# Patient Record
Sex: FEMALE
[De-identification: names, ages, dates, MRNs, and addresses within clinical notes are randomized per-mention and may not be internally consistent; named-entity substitution may affect disease eponyms.]

---

## 2019-07-23 ENCOUNTER — RX ONLY (RX ONLY)
Age: 64
End: 2019-07-23

## 2019-07-23 ENCOUNTER — OFFICE (OUTPATIENT)
Dept: URBAN - METROPOLITAN AREA CLINIC 114 | Facility: CLINIC | Age: 64
Setting detail: OPHTHALMOLOGY
End: 2019-07-23
Payer: COMMERCIAL

## 2019-07-23 DIAGNOSIS — H35.033: ICD-10-CM

## 2019-07-23 DIAGNOSIS — H04.123: ICD-10-CM

## 2019-07-23 DIAGNOSIS — H11.153: ICD-10-CM

## 2019-07-23 DIAGNOSIS — E11.3293: ICD-10-CM

## 2019-07-23 DIAGNOSIS — H43.813: ICD-10-CM

## 2019-07-23 PROBLEM — H10.45 ALLERGIC CONJUNCTIVITIS ; BOTH EYES: Status: RESOLVED | Noted: 2019-05-28 | Resolved: 2019-07-23

## 2019-07-23 PROCEDURE — 92012 INTRM OPH EXAM EST PATIENT: CPT | Performed by: OPHTHALMOLOGY

## 2019-07-23 ASSESSMENT — REFRACTION_AUTOREFRACTION
OD_AXIS: 090
OS_SPHERE: -0.25
OD_SPHERE: -0.50
OS_AXIS: 098
OD_CYLINDER: -1.50
OS_CYLINDER: -1.25

## 2019-07-23 ASSESSMENT — REFRACTION_MANIFEST
OS_VA3: 20/
OS_VA1: 20/
OD_VA2: 20/
OU_VA: 20/
OS_VA3: 20/
OD_VA1: 20/
OD_VA2: 20/
OS_VA2: 20/
OD_VA3: 20/
OD_VA1: 20/
OU_VA: 20/
OS_VA2: 20/
OD_VA3: 20/
OS_VA1: 20/

## 2019-07-23 ASSESSMENT — REFRACTION_CURRENTRX
OS_OVR_VA: 20/
OD_OVR_VA: 20/
OD_OVR_VA: 20/
OS_OVR_VA: 20/
OS_OVR_VA: 20/
OD_OVR_VA: 20/

## 2019-07-23 ASSESSMENT — VISUAL ACUITY
OD_BCVA: 20/20
OS_BCVA: 20/20

## 2019-07-23 ASSESSMENT — KERATOMETRY
OS_AXISANGLE_DEGREES: 137
OD_K1POWER_DIOPTERS: 40.00
OD_K2POWER_DIOPTERS: 40.00
OS_K1POWER_DIOPTERS: 40.00
OS_K2POWER_DIOPTERS: 40.25
OD_AXISANGLE_DEGREES: 090

## 2019-07-23 ASSESSMENT — CONFRONTATIONAL VISUAL FIELD TEST (CVF)
OD_FINDINGS: FULL
OS_FINDINGS: FULL

## 2019-07-23 ASSESSMENT — AXIALLENGTH_DERIVED
OS_AL: 25.286
OD_AL: 25.5085

## 2019-07-23 ASSESSMENT — SPHEQUIV_DERIVED
OD_SPHEQUIV: -1.25
OS_SPHEQUIV: -0.875

## 2019-07-23 ASSESSMENT — DRY EYES - PHYSICIAN NOTES
OD_GENERALCOMMENTS: CORNEAL STAINING
OS_GENERALCOMMENTS: CORNEAL STAINING

## 2020-02-05 ENCOUNTER — OFFICE (OUTPATIENT)
Dept: URBAN - METROPOLITAN AREA CLINIC 94 | Facility: CLINIC | Age: 65
Setting detail: OPHTHALMOLOGY
End: 2020-02-05
Payer: COMMERCIAL

## 2020-02-05 DIAGNOSIS — H43.813: ICD-10-CM

## 2020-02-05 DIAGNOSIS — H35.033: ICD-10-CM

## 2020-02-05 DIAGNOSIS — H11.153: ICD-10-CM

## 2020-02-05 DIAGNOSIS — E11.3293: ICD-10-CM

## 2020-02-05 DIAGNOSIS — H04.123: ICD-10-CM

## 2020-02-05 PROCEDURE — 92014 COMPRE OPH EXAM EST PT 1/>: CPT | Performed by: OPHTHALMOLOGY

## 2020-02-05 PROCEDURE — 92250 FUNDUS PHOTOGRAPHY W/I&R: CPT | Performed by: OPHTHALMOLOGY

## 2020-02-05 ASSESSMENT — REFRACTION_AUTOREFRACTION
OD_AXIS: 092
OD_CYLINDER: -1.50
OS_AXIS: 094
OS_CYLINDER: -1.75
OS_SPHERE: PLANO
OD_SPHERE: PLANO

## 2020-02-05 ASSESSMENT — REFRACTION_MANIFEST
OD_VA2: 20/
OS_VA2: 20/
OS_VA3: 20/
OD_VA1: 20/
OD_VA3: 20/
OU_VA: 20/
OS_VA1: 20/

## 2020-02-05 ASSESSMENT — CONFRONTATIONAL VISUAL FIELD TEST (CVF)
OS_FINDINGS: FULL
OD_FINDINGS: FULL

## 2020-02-05 ASSESSMENT — KERATOMETRY
OS_K2POWER_DIOPTERS: 40.00
OD_K1POWER_DIOPTERS: 39.50
OD_K2POWER_DIOPTERS: 40.00
OS_K1POWER_DIOPTERS: 39.75
OS_AXISANGLE_DEGREES: 142
OD_AXISANGLE_DEGREES: 180

## 2020-02-05 ASSESSMENT — DRY EYES - PHYSICIAN NOTES
OS_GENERALCOMMENTS: CORNEAL STAINING
OD_GENERALCOMMENTS: CORNEAL STAINING

## 2020-02-05 ASSESSMENT — VISUAL ACUITY
OD_BCVA: 20/25+
OS_BCVA: 20/25

## 2020-03-11 ENCOUNTER — RX ONLY (RX ONLY)
Age: 65
End: 2020-03-11

## 2020-03-11 ENCOUNTER — OFFICE (OUTPATIENT)
Dept: URBAN - METROPOLITAN AREA CLINIC 94 | Facility: CLINIC | Age: 65
Setting detail: OPHTHALMOLOGY
End: 2020-03-11
Payer: COMMERCIAL

## 2020-03-11 ENCOUNTER — ASC (OUTPATIENT)
Dept: URBAN - METROPOLITAN AREA SURGERY 8 | Facility: SURGERY | Age: 65
Setting detail: OPHTHALMOLOGY
End: 2020-03-11
Payer: COMMERCIAL

## 2020-03-11 DIAGNOSIS — H35.033: ICD-10-CM

## 2020-03-11 DIAGNOSIS — E11.3311: ICD-10-CM

## 2020-03-11 DIAGNOSIS — Z79.84: ICD-10-CM

## 2020-03-11 DIAGNOSIS — H25.13: ICD-10-CM

## 2020-03-11 DIAGNOSIS — H40.013: ICD-10-CM

## 2020-03-11 DIAGNOSIS — H11.153: ICD-10-CM

## 2020-03-11 DIAGNOSIS — H04.123: ICD-10-CM

## 2020-03-11 DIAGNOSIS — H43.813: ICD-10-CM

## 2020-03-11 PROCEDURE — 67210 TREATMENT OF RETINAL LESION: CPT | Performed by: OPHTHALMOLOGY

## 2020-03-11 PROCEDURE — 92012 INTRM OPH EXAM EST PATIENT: CPT | Performed by: OPHTHALMOLOGY

## 2020-03-11 ASSESSMENT — VISUAL ACUITY
OS_BCVA: 20/20
OD_BCVA: 20/20-1

## 2020-03-11 ASSESSMENT — KERATOMETRY
OS_K1POWER_DIOPTERS: 40.25
OD_AXISANGLE_DEGREES: 165
OD_K2POWER_DIOPTERS: 40.00
OS_AXISANGLE_DEGREES: 090
OS_K2POWER_DIOPTERS: 40.25
OD_K1POWER_DIOPTERS: 39.75

## 2020-03-11 ASSESSMENT — REFRACTION_AUTOREFRACTION
OS_CYLINDER: -1.50
OS_SPHERE: -0.25
OD_SPHERE: 0.00
OD_AXIS: 093
OS_AXIS: 094
OD_CYLINDER: -1.50

## 2020-03-11 ASSESSMENT — SPHEQUIV_DERIVED
OS_SPHEQUIV: -1
OD_SPHEQUIV: -0.75

## 2020-03-11 ASSESSMENT — DRY EYES - PHYSICIAN NOTES
OD_GENERALCOMMENTS: CORNEAL STAINING
OS_GENERALCOMMENTS: CORNEAL STAINING

## 2020-03-11 ASSESSMENT — CONFRONTATIONAL VISUAL FIELD TEST (CVF)
OD_FINDINGS: FULL
OS_FINDINGS: FULL

## 2020-03-11 ASSESSMENT — AXIALLENGTH_DERIVED
OS_AL: 25.2892
OD_AL: 25.3356

## 2020-03-18 ENCOUNTER — ASC (OUTPATIENT)
Dept: URBAN - METROPOLITAN AREA SURGERY 8 | Facility: SURGERY | Age: 65
Setting detail: OPHTHALMOLOGY
End: 2020-03-18
Payer: COMMERCIAL

## 2020-03-18 DIAGNOSIS — E11.3312: ICD-10-CM

## 2020-03-18 PROCEDURE — 67210 TREATMENT OF RETINAL LESION: CPT | Performed by: OPHTHALMOLOGY

## 2020-03-18 ASSESSMENT — SPHEQUIV_DERIVED
OD_SPHEQUIV: -0.875
OS_SPHEQUIV: 0

## 2020-03-18 ASSESSMENT — KERATOMETRY
OS_K2POWER_DIOPTERS: 40.25
OS_AXISANGLE_DEGREES: 098
OD_K1POWER_DIOPTERS: 40.00
OD_K2POWER_DIOPTERS: 40.00
OD_AXISANGLE_DEGREES: 090
OS_K1POWER_DIOPTERS: 40.00

## 2020-03-18 ASSESSMENT — REFRACTION_AUTOREFRACTION
OD_CYLINDER: -0.75
OS_SPHERE: +0.50
OD_AXIS: 103
OS_CYLINDER: -1.00
OD_SPHERE: -0.50
OS_AXIS: 093

## 2020-03-18 ASSESSMENT — VISUAL ACUITY
OS_BCVA: 20/20
OD_BCVA: 20/20-1

## 2020-03-18 ASSESSMENT — CONFRONTATIONAL VISUAL FIELD TEST (CVF)
OS_FINDINGS: FULL
OD_FINDINGS: FULL

## 2020-03-18 ASSESSMENT — AXIALLENGTH_DERIVED
OS_AL: 24.9003
OD_AL: 25.3388

## 2020-03-18 ASSESSMENT — DRY EYES - PHYSICIAN NOTES
OD_GENERALCOMMENTS: CORNEAL STAINING
OS_GENERALCOMMENTS: CORNEAL STAINING

## 2020-08-11 ENCOUNTER — OFFICE (OUTPATIENT)
Dept: URBAN - METROPOLITAN AREA CLINIC 114 | Facility: CLINIC | Age: 65
Setting detail: OPHTHALMOLOGY
End: 2020-08-11
Payer: MEDICARE

## 2020-08-11 DIAGNOSIS — H43.813: ICD-10-CM

## 2020-08-11 DIAGNOSIS — H04.123: ICD-10-CM

## 2020-08-11 DIAGNOSIS — E11.3311: ICD-10-CM

## 2020-08-11 DIAGNOSIS — H25.13: ICD-10-CM

## 2020-08-11 DIAGNOSIS — H35.033: ICD-10-CM

## 2020-08-11 DIAGNOSIS — E11.3312: ICD-10-CM

## 2020-08-11 DIAGNOSIS — H40.013: ICD-10-CM

## 2020-08-11 DIAGNOSIS — H11.153: ICD-10-CM

## 2020-08-11 PROCEDURE — 92133 CPTRZD OPH DX IMG PST SGM ON: CPT | Performed by: OPHTHALMOLOGY

## 2020-08-11 PROCEDURE — 92014 COMPRE OPH EXAM EST PT 1/>: CPT | Performed by: OPHTHALMOLOGY

## 2020-08-11 ASSESSMENT — DRY EYES - PHYSICIAN NOTES
OS_GENERALCOMMENTS: CORNEAL STAINING
OD_GENERALCOMMENTS: CORNEAL STAINING

## 2020-08-11 ASSESSMENT — CONFRONTATIONAL VISUAL FIELD TEST (CVF)
OD_FINDINGS: FULL
OS_FINDINGS: FULL

## 2020-08-13 ASSESSMENT — SPHEQUIV_DERIVED
OS_SPHEQUIV: -1.125
OD_SPHEQUIV: -1.125

## 2020-08-13 ASSESSMENT — REFRACTION_CURRENTRX
OD_SPHERE: +0.25
OD_OVR_VA: 20/
OD_VPRISM_DIRECTION: SV
OS_CYLINDER: -1.75
OD_CYLINDER: -1.50
OS_OVR_VA: 20/
OS_VPRISM_DIRECTION: SV
OS_AXIS: 092
OS_SPHERE: +0.25
OD_AXIS: 073

## 2020-08-13 ASSESSMENT — KERATOMETRY
OS_AXISANGLE_DEGREES: 106
OS_K2POWER_DIOPTERS: 40.50
OS_K1POWER_DIOPTERS: 40.00
OD_AXISANGLE_DEGREES: 095
OD_K2POWER_DIOPTERS: 40.25
OD_K1POWER_DIOPTERS: 40.00

## 2020-08-13 ASSESSMENT — AXIALLENGTH_DERIVED
OS_AL: 25.3453
OD_AL: 25.3984

## 2020-08-13 ASSESSMENT — VISUAL ACUITY
OS_BCVA: 20/30-2
OD_BCVA: 20/40

## 2020-08-13 ASSESSMENT — REFRACTION_AUTOREFRACTION
OS_SPHERE: -0.50
OD_AXIS: 088
OD_CYLINDER: -0.75
OD_SPHERE: -0.75
OS_AXIS: 093
OS_CYLINDER: -1.25

## 2020-08-19 ENCOUNTER — OFFICE (OUTPATIENT)
Dept: URBAN - METROPOLITAN AREA CLINIC 94 | Facility: CLINIC | Age: 65
Setting detail: OPHTHALMOLOGY
End: 2020-08-19
Payer: MEDICARE

## 2020-08-19 DIAGNOSIS — H11.153: ICD-10-CM

## 2020-08-19 DIAGNOSIS — H40.013: ICD-10-CM

## 2020-08-19 DIAGNOSIS — E11.3311: ICD-10-CM

## 2020-08-19 DIAGNOSIS — H25.11: ICD-10-CM

## 2020-08-19 DIAGNOSIS — H04.123: ICD-10-CM

## 2020-08-19 DIAGNOSIS — H35.033: ICD-10-CM

## 2020-08-19 DIAGNOSIS — H43.813: ICD-10-CM

## 2020-08-19 DIAGNOSIS — H25.13: ICD-10-CM

## 2020-08-19 DIAGNOSIS — E11.3312: ICD-10-CM

## 2020-08-19 PROCEDURE — 92012 INTRM OPH EXAM EST PATIENT: CPT | Performed by: OPHTHALMOLOGY

## 2020-08-19 PROCEDURE — 92136 OPHTHALMIC BIOMETRY: CPT | Performed by: OPHTHALMOLOGY

## 2020-08-19 PROCEDURE — 92083 EXTENDED VISUAL FIELD XM: CPT | Performed by: OPHTHALMOLOGY

## 2020-08-19 ASSESSMENT — KERATOMETRY
OD_CYLAXISANGLE_DEGREES: 180
OS_K1POWER_DIOPTERS: 40.00
OD_K1K2_AVERAGE: 40
OD_AXISANGLE_DEGREES: 090
OS_K2POWER_DIOPTERS: 40.00
OD_K2POWER_DIOPTERS: 40.00
OS_AXISANGLE2_DEGREES: 090
OS_K2POWER_DIOPTERS: 40.00
OD_K1POWER_DIOPTERS: 40.00
OS_K1POWER_DIOPTERS: 40.00
OS_CYLAXISANGLE_DEGREES: 180
OD_K1POWER_DIOPTERS: 40.00
OS_AXISANGLE_DEGREES: 180
OS_K1K2_AVERAGE: 40
OD_AXISANGLE2_DEGREES: 090
OD_K2POWER_DIOPTERS: 40.00
OS_AXISANGLE_DEGREES: 090
OD_AXISANGLE_DEGREES: 180

## 2020-08-19 ASSESSMENT — REFRACTION_CURRENTRX
OD_VPRISM_DIRECTION: SV
OD_VPRISM_DIRECTION: SV
OD_OVR_VA: 20/
OD_CYLINDER: -1.50
OS_SPHERE: +0.25
OS_VPRISM_DIRECTION: SV
OS_CYLINDER: -1.75
OS_SPHERE: -0.25
OS_AXIS: 087
OS_AXIS: 092
OD_ADD: +2.75
OD_AXIS: 100
OS_OVR_VA: 20/
OS_CYLINDER: -1.75
OD_CYLINDER: -1.50
OS_OVR_VA: 20/
OS_ADD: +2.75
OD_OVR_VA: 20/
OD_SPHERE: +0.25
OD_AXIS: 073
OD_SPHERE: +0.25
OS_VPRISM_DIRECTION: SV

## 2020-08-19 ASSESSMENT — TONOMETRY
OS_IOP_MMHG: 21
OD_IOP_MMHG: 19

## 2020-08-19 ASSESSMENT — REFRACTION_AUTOREFRACTION
OD_CYLINDER: -1.25
OS_SPHERE: -0.50
OS_AXIS: 097
OD_SPHERE: -0.25
OD_AXIS: 091
OS_CYLINDER: -1.25

## 2020-08-19 ASSESSMENT — VISUAL ACUITY
OS_BCVA: 20/40-2
OD_BCVA: 20/80+2

## 2020-08-19 ASSESSMENT — AXIALLENGTH_DERIVED
OD_AL: 25.3388
OS_AL: 25.4517

## 2020-08-19 ASSESSMENT — SPHEQUIV_DERIVED
OS_SPHEQUIV: -1.125
OD_SPHEQUIV: -0.875

## 2020-08-19 ASSESSMENT — CONFRONTATIONAL VISUAL FIELD TEST (CVF)
OD_FINDINGS: FULL
OS_FINDINGS: FULL

## 2020-08-19 ASSESSMENT — DRY EYES - PHYSICIAN NOTES
OS_GENERALCOMMENTS: CORNEAL STAINING
OD_GENERALCOMMENTS: CORNEAL STAINING

## 2020-09-14 ENCOUNTER — OFFICE (OUTPATIENT)
Dept: URBAN - METROPOLITAN AREA CLINIC 94 | Facility: CLINIC | Age: 65
Setting detail: OPHTHALMOLOGY
End: 2020-09-14
Payer: MEDICARE

## 2020-09-14 DIAGNOSIS — H25.13: ICD-10-CM

## 2020-09-14 PROCEDURE — 99211 OFF/OP EST MAY X REQ PHY/QHP: CPT | Performed by: OPHTHALMOLOGY

## 2020-09-15 ENCOUNTER — OFFICE (OUTPATIENT)
Dept: URBAN - METROPOLITAN AREA CLINIC 112 | Facility: CLINIC | Age: 65
Setting detail: OPHTHALMOLOGY
End: 2020-09-15
Payer: MEDICARE

## 2020-09-15 DIAGNOSIS — H25.13: ICD-10-CM

## 2020-09-15 DIAGNOSIS — E11.3311: ICD-10-CM

## 2020-09-15 DIAGNOSIS — E11.3312: ICD-10-CM

## 2020-09-15 PROCEDURE — 92012 INTRM OPH EXAM EST PATIENT: CPT | Performed by: OPHTHALMOLOGY

## 2020-09-15 PROCEDURE — 92134 CPTRZ OPH DX IMG PST SGM RTA: CPT | Performed by: OPHTHALMOLOGY

## 2020-09-15 ASSESSMENT — KERATOMETRY
OD_K1POWER_DIOPTERS: 40.00
OS_K2POWER_DIOPTERS: 40.25
OD_K2POWER_DIOPTERS: 40.25
OD_AXISANGLE_DEGREES: 171
OS_K1POWER_DIOPTERS: 39.75
OS_AXISANGLE_DEGREES: 168

## 2020-09-15 ASSESSMENT — REFRACTION_AUTOREFRACTION
OS_AXIS: 094
OS_SPHERE: 0.00
OD_AXIS: 086
OD_SPHERE: +0.25
OD_CYLINDER: -1.25
OS_CYLINDER: -1.50

## 2020-09-15 ASSESSMENT — REFRACTION_CURRENTRX
OD_VPRISM_DIRECTION: SV
OD_CYLINDER: -1.50
OS_AXIS: 087
OD_ADD: +2.75
OS_VPRISM_DIRECTION: SV
OS_VPRISM_DIRECTION: SV
OD_AXIS: 100
OD_SPHERE: +0.25
OS_OVR_VA: 20/
OD_VPRISM_DIRECTION: SV
OS_SPHERE: +0.25
OS_ADD: +2.75
OS_OVR_VA: 20/
OS_CYLINDER: -1.75
OD_OVR_VA: 20/
OS_CYLINDER: -1.75
OS_AXIS: 092
OD_AXIS: 073
OD_OVR_VA: 20/
OD_SPHERE: +0.25
OD_CYLINDER: -1.50
OS_SPHERE: -0.25

## 2020-09-15 ASSESSMENT — AXIALLENGTH_DERIVED
OD_AL: 25.0642
OS_AL: 25.2828

## 2020-09-15 ASSESSMENT — TONOMETRY
OS_IOP_MMHG: 12
OD_IOP_MMHG: 11

## 2020-09-15 ASSESSMENT — DRY EYES - PHYSICIAN NOTES
OS_GENERALCOMMENTS: CORNEAL STAINING
OD_GENERALCOMMENTS: CORNEAL STAINING

## 2020-09-15 ASSESSMENT — SPHEQUIV_DERIVED
OD_SPHEQUIV: -0.375
OS_SPHEQUIV: -0.75

## 2020-09-15 ASSESSMENT — VISUAL ACUITY
OS_BCVA: 20/40-
OD_BCVA: 20/80+

## 2020-09-15 ASSESSMENT — CONFRONTATIONAL VISUAL FIELD TEST (CVF)
OS_FINDINGS: FULL
OD_FINDINGS: FULL

## 2020-09-17 ENCOUNTER — AMBULATORY SURGERY CENTER (OUTPATIENT)
Dept: URBAN - METROPOLITAN AREA SURGERY 27 | Facility: SURGERY | Age: 65
Setting detail: OPHTHALMOLOGY
End: 2020-09-17
Payer: MEDICARE

## 2020-09-17 DIAGNOSIS — H52.4: ICD-10-CM

## 2020-09-17 DIAGNOSIS — H52.211: ICD-10-CM

## 2020-09-17 DIAGNOSIS — H25.11: ICD-10-CM

## 2020-09-17 PROCEDURE — 66984 XCAPSL CTRC RMVL W/O ECP: CPT | Performed by: OPHTHALMOLOGY

## 2020-09-17 PROCEDURE — 0356T INSERTION OF DRUG-ELUTING IMPLANT (INCLUDING PUNCTAL DILATION AND IMPLANT REMOVAL WHEN PERFORMED) INTO LACRIMAL CANALICULUS, EACH: CPT | Performed by: OPHTHALMOLOGY

## 2020-09-17 PROCEDURE — V2788P PANOPTIX: Performed by: OPHTHALMOLOGY

## 2020-09-17 PROCEDURE — FEMTO CATARACT LASER: Performed by: OPHTHALMOLOGY

## 2020-09-18 ENCOUNTER — RX ONLY (RX ONLY)
Age: 65
End: 2020-09-18

## 2020-09-18 ENCOUNTER — OFFICE (OUTPATIENT)
Dept: URBAN - METROPOLITAN AREA CLINIC 94 | Facility: CLINIC | Age: 65
Setting detail: OPHTHALMOLOGY
End: 2020-09-18
Payer: MEDICARE

## 2020-09-18 DIAGNOSIS — Z96.1: ICD-10-CM

## 2020-09-18 PROCEDURE — 99024 POSTOP FOLLOW-UP VISIT: CPT | Performed by: PHYSICIAN ASSISTANT

## 2020-09-18 ASSESSMENT — KERATOMETRY
OD_AXISANGLE_DEGREES: 100
OS_K2POWER_DIOPTERS: 40.00
OD_K1POWER_DIOPTERS: 40.00
OS_AXISANGLE_DEGREES: 090
OS_K1POWER_DIOPTERS: 40.00
OD_K2POWER_DIOPTERS: 40.50

## 2020-09-18 ASSESSMENT — REFRACTION_AUTOREFRACTION
OS_SPHERE: PLANO
OD_SPHERE: PLANO
OD_CYLINDER: -0.25
OS_CYLINDER: -1.50
OD_AXIS: 045
OS_AXIS: 097

## 2020-09-18 ASSESSMENT — REFRACTION_CURRENTRX
OS_CYLINDER: -1.75
OS_AXIS: 087
OS_CYLINDER: -1.75
OD_SPHERE: +0.25
OS_AXIS: 092
OS_VPRISM_DIRECTION: SV
OS_SPHERE: +0.25
OD_CYLINDER: -1.50
OD_VPRISM_DIRECTION: SV
OD_OVR_VA: 20/
OD_AXIS: 100
OD_VPRISM_DIRECTION: SV
OS_OVR_VA: 20/
OS_ADD: +2.75
OD_SPHERE: +0.25
OS_VPRISM_DIRECTION: SV
OD_CYLINDER: -1.50
OS_SPHERE: -0.25
OD_ADD: +2.75
OD_OVR_VA: 20/
OS_OVR_VA: 20/
OD_AXIS: 073

## 2020-09-18 ASSESSMENT — VISUAL ACUITY
OS_BCVA: 20/25
OD_BCVA: 20/30-

## 2020-09-18 ASSESSMENT — DRY EYES - PHYSICIAN NOTES
OS_GENERALCOMMENTS: CORNEAL STAINING
OD_GENERALCOMMENTS: CORNEAL STAINING

## 2020-09-18 ASSESSMENT — TONOMETRY: OD_IOP_MMHG: 17

## 2020-09-18 ASSESSMENT — CONFRONTATIONAL VISUAL FIELD TEST (CVF)
OD_FINDINGS: FULL
OS_FINDINGS: FULL

## 2020-09-18 ASSESSMENT — CORNEAL EDEMA CLINICAL DESCRIPTION: OD_CORNEALEDEMA: 2+

## 2020-09-25 ENCOUNTER — OFFICE (OUTPATIENT)
Dept: URBAN - METROPOLITAN AREA CLINIC 94 | Facility: CLINIC | Age: 65
Setting detail: OPHTHALMOLOGY
End: 2020-09-25
Payer: MEDICARE

## 2020-09-25 ENCOUNTER — RX ONLY (RX ONLY)
Age: 65
End: 2020-09-25

## 2020-09-25 DIAGNOSIS — H25.12: ICD-10-CM

## 2020-09-25 DIAGNOSIS — Z96.1: ICD-10-CM

## 2020-09-25 PROCEDURE — 92136 OPHTHALMIC BIOMETRY: CPT | Performed by: PHYSICIAN ASSISTANT

## 2020-09-25 PROCEDURE — 99024 POSTOP FOLLOW-UP VISIT: CPT | Performed by: PHYSICIAN ASSISTANT

## 2020-09-25 PROCEDURE — 99211 OFF/OP EST MAY X REQ PHY/QHP: CPT | Performed by: OPHTHALMOLOGY

## 2020-09-25 ASSESSMENT — REFRACTION_CURRENTRX
OD_CYLINDER: -1.50
OD_SPHERE: +0.25
OD_ADD: +2.75
OD_OVR_VA: 20/
OD_VPRISM_DIRECTION: SV
OD_VPRISM_DIRECTION: SV
OS_SPHERE: -0.25
OD_CYLINDER: -1.50
OD_AXIS: 100
OS_OVR_VA: 20/
OD_AXIS: 073
OS_ADD: +2.75
OS_VPRISM_DIRECTION: SV
OS_SPHERE: +0.25
OD_OVR_VA: 20/
OD_SPHERE: +0.25
OS_OVR_VA: 20/
OS_CYLINDER: -1.75
OS_VPRISM_DIRECTION: SV
OS_CYLINDER: -1.75
OS_AXIS: 087
OS_AXIS: 092

## 2020-09-25 ASSESSMENT — KERATOMETRY
OS_AXISANGLE_DEGREES: 133
OS_K2POWER_DIOPTERS: 40.25
OD_AXISANGLE_DEGREES: 092
OD_K2POWER_DIOPTERS: 41.50
OS_K1POWER_DIOPTERS: 39.75
OD_K1POWER_DIOPTERS: 40.00

## 2020-09-25 ASSESSMENT — REFRACTION_AUTOREFRACTION
OS_SPHERE: PLANO
OS_AXIS: 084
OD_CYLINDER: -0.25
OD_AXIS: 011
OS_CYLINDER: -1.50
OD_SPHERE: PLANO

## 2020-09-25 ASSESSMENT — REFRACTION_MANIFEST
OS_CYLINDER: -0.25
OS_VA1: 20/20
OD_VA1: 20/20
OS_AXIS: 080
OS_SPHERE: PLANO
OD_SPHERE: PLANO

## 2020-09-25 ASSESSMENT — DRY EYES - PHYSICIAN NOTES
OD_GENERALCOMMENTS: CORNEAL STAINING
OS_GENERALCOMMENTS: CORNEAL STAINING

## 2020-09-25 ASSESSMENT — VISUAL ACUITY
OS_BCVA: 20/20
OD_BCVA: 20/30-1

## 2020-09-25 ASSESSMENT — CONFRONTATIONAL VISUAL FIELD TEST (CVF)
OD_FINDINGS: FULL
OS_FINDINGS: FULL

## 2020-09-25 ASSESSMENT — TONOMETRY: OD_IOP_MMHG: 21

## 2020-09-30 ENCOUNTER — AMBULATORY SURGERY CENTER (OUTPATIENT)
Dept: URBAN - METROPOLITAN AREA SURGERY 27 | Facility: SURGERY | Age: 65
Setting detail: OPHTHALMOLOGY
End: 2020-09-30
Payer: MEDICARE

## 2020-09-30 DIAGNOSIS — H52.4: ICD-10-CM

## 2020-09-30 DIAGNOSIS — H25.12: ICD-10-CM

## 2020-09-30 DIAGNOSIS — H52.212: ICD-10-CM

## 2020-09-30 PROCEDURE — 0356T INSERTION OF DRUG-ELUTING IMPLANT (INCLUDING PUNCTAL DILATION AND IMPLANT REMOVAL WHEN PERFORMED) INTO LACRIMAL CANALICULUS, EACH: CPT | Performed by: OPHTHALMOLOGY

## 2020-09-30 PROCEDURE — V2788P PANOPTIX: Performed by: OPHTHALMOLOGY

## 2020-09-30 PROCEDURE — FEMTO CATARACT LASER: Performed by: OPHTHALMOLOGY

## 2020-09-30 PROCEDURE — A9270 NON-COVERED ITEM OR SERVICE: HCPCS | Performed by: OPHTHALMOLOGY

## 2020-09-30 PROCEDURE — 66984 XCAPSL CTRC RMVL W/O ECP: CPT | Performed by: OPHTHALMOLOGY

## 2020-10-01 ENCOUNTER — RX ONLY (RX ONLY)
Age: 65
End: 2020-10-01

## 2020-10-01 ENCOUNTER — OFFICE (OUTPATIENT)
Dept: URBAN - METROPOLITAN AREA CLINIC 112 | Facility: CLINIC | Age: 65
Setting detail: OPHTHALMOLOGY
End: 2020-10-01
Payer: MEDICARE

## 2020-10-01 DIAGNOSIS — Z96.1: ICD-10-CM

## 2020-10-01 PROCEDURE — 99024 POSTOP FOLLOW-UP VISIT: CPT | Performed by: PHYSICIAN ASSISTANT

## 2020-10-01 ASSESSMENT — REFRACTION_AUTOREFRACTION
OD_CYLINDER: -0.50
OS_SPHERE: +0.25
OS_CYLINDER: -0.25
OD_AXIS: 061
OS_AXIS: 058
OD_SPHERE: +0.25

## 2020-10-01 ASSESSMENT — REFRACTION_CURRENTRX
OD_SPHERE: +0.25
OD_CYLINDER: -1.50
OD_VPRISM_DIRECTION: SV
OD_CYLINDER: -1.50
OS_SPHERE: -0.25
OS_OVR_VA: 20/
OD_VPRISM_DIRECTION: SV
OS_AXIS: 087
OS_CYLINDER: -1.75
OS_AXIS: 092
OS_CYLINDER: -1.75
OD_AXIS: 100
OD_AXIS: 073
OD_OVR_VA: 20/
OS_ADD: +2.75
OS_SPHERE: +0.25
OS_OVR_VA: 20/
OS_VPRISM_DIRECTION: SV
OS_VPRISM_DIRECTION: SV
OD_OVR_VA: 20/
OD_SPHERE: +0.25
OD_ADD: +2.75

## 2020-10-01 ASSESSMENT — TONOMETRY
OD_IOP_MMHG: 13
OS_IOP_MMHG: 14

## 2020-10-01 ASSESSMENT — VISUAL ACUITY
OD_BCVA: 20/30-1
OS_BCVA: 20/20

## 2020-10-01 ASSESSMENT — REFRACTION_MANIFEST
OS_VA1: 20/20
OD_VA1: 20/20
OS_AXIS: 080
OD_SPHERE: PLANO
OS_SPHERE: PLANO
OS_CYLINDER: -0.25

## 2020-10-01 ASSESSMENT — DRY EYES - PHYSICIAN NOTES
OS_GENERALCOMMENTS: CORNEAL STAINING
OD_GENERALCOMMENTS: CORNEAL STAINING

## 2020-10-01 ASSESSMENT — KERATOMETRY
OD_AXISANGLE_DEGREES: 092
OD_K1POWER_DIOPTERS: 40.25
OD_K2POWER_DIOPTERS: 40.75
OS_AXISANGLE_DEGREES: 101
OS_K2POWER_DIOPTERS: 40.50
OS_K1POWER_DIOPTERS: 40.25

## 2020-10-01 ASSESSMENT — AXIALLENGTH_DERIVED
OD_AL: 24.7478
OS_AL: 24.7448

## 2020-10-01 ASSESSMENT — CONFRONTATIONAL VISUAL FIELD TEST (CVF)
OD_FINDINGS: FULL
OS_FINDINGS: FULL

## 2020-10-01 ASSESSMENT — SPHEQUIV_DERIVED
OS_SPHEQUIV: 0.125
OD_SPHEQUIV: 0

## 2020-10-10 ENCOUNTER — OFFICE (OUTPATIENT)
Dept: URBAN - METROPOLITAN AREA CLINIC 113 | Facility: CLINIC | Age: 65
Setting detail: OPHTHALMOLOGY
End: 2020-10-10
Payer: MEDICARE

## 2020-10-10 DIAGNOSIS — Z96.1: ICD-10-CM

## 2020-10-10 PROCEDURE — 99024 POSTOP FOLLOW-UP VISIT: CPT | Performed by: OPTOMETRIST

## 2020-10-10 ASSESSMENT — CORNEAL EDEMA CLINICAL DESCRIPTION
OS_CORNEALEDEMA: ABSENT
OD_CORNEALEDEMA: ABSENT

## 2020-10-10 ASSESSMENT — REFRACTION_MANIFEST
OD_SPHERE: PLANO
OD_VA1: 20/20
OS_VA1: 20/20
OS_SPHERE: PLANO
OS_CYLINDER: -0.25
OS_AXIS: 080

## 2020-10-10 ASSESSMENT — KERATOMETRY
OD_K2POWER_DIOPTERS: 41.00
OS_K1POWER_DIOPTERS: 40.00
OS_K2POWER_DIOPTERS: 40.50
OD_AXISANGLE_DEGREES: 105
OD_K1POWER_DIOPTERS: 40.00
OS_AXISANGLE_DEGREES: 121

## 2020-10-10 ASSESSMENT — REFRACTION_CURRENTRX
OS_CYLINDER: -1.75
OS_SPHERE: +0.25
OD_VPRISM_DIRECTION: SV
OS_AXIS: 092
OD_CYLINDER: -1.50
OS_SPHERE: -0.25
OD_AXIS: 073
OD_SPHERE: +0.25
OD_AXIS: 100
OS_VPRISM_DIRECTION: SV
OD_CYLINDER: -1.50
OS_OVR_VA: 20/
OS_AXIS: 087
OD_VPRISM_DIRECTION: SV
OS_VPRISM_DIRECTION: SV
OD_OVR_VA: 20/
OS_ADD: +2.75
OD_SPHERE: +0.25
OS_CYLINDER: -1.75
OD_OVR_VA: 20/
OS_OVR_VA: 20/
OD_ADD: +2.75

## 2020-10-10 ASSESSMENT — TONOMETRY
OS_IOP_MMHG: 21
OD_IOP_MMHG: 16

## 2020-10-10 ASSESSMENT — REFRACTION_AUTOREFRACTION
OD_AXIS: 042
OS_CYLINDER: -0.25
OS_AXIS: 060
OD_CYLINDER: -0.50
OS_SPHERE: +0.25
OD_SPHERE: +0.50

## 2020-10-10 ASSESSMENT — VISUAL ACUITY
OD_BCVA: 20/25-2
OS_BCVA: 20/20

## 2020-10-10 ASSESSMENT — AXIALLENGTH_DERIVED
OS_AL: 24.7954
OD_AL: 24.6411

## 2020-10-10 ASSESSMENT — CONFRONTATIONAL VISUAL FIELD TEST (CVF)
OS_FINDINGS: FULL
OD_FINDINGS: FULL

## 2020-10-10 ASSESSMENT — SPHEQUIV_DERIVED
OS_SPHEQUIV: 0.125
OD_SPHEQUIV: 0.25

## 2020-10-26 PROBLEM — H25.12 CATARACT SENILE NUCLEAR SCLEROSIS; LEFT EYE: Status: ACTIVE | Noted: 2020-09-25

## 2020-10-26 ASSESSMENT — REFRACTION_MANIFEST
OD_VA1: 20/20
OS_CYLINDER: -0.25
OS_SPHERE: PLANO
OD_VA1: 20/20
OD_SPHERE: PLANO
OS_AXIS: 080
OS_SPHERE: PLANO
OS_AXIS: 080
OS_CYLINDER: -0.25
OS_VA1: 20/20
OD_SPHERE: PLANO
OS_VA1: 20/20

## 2020-10-26 ASSESSMENT — KERATOMETRY
OD_AXISANGLE_DEGREES: 105
OD_K2POWER_DIOPTERS: 41.00
OS_K2POWER_DIOPTERS: 40.50
OS_AXISANGLE_DEGREES: 121
OD_K1POWER_DIOPTERS: 40.00
OS_K1POWER_DIOPTERS: 40.00
OD_AXISANGLE_DEGREES: 105
OS_K2POWER_DIOPTERS: 40.50
OS_AXISANGLE_DEGREES: 121
OS_K1POWER_DIOPTERS: 40.00
OD_K1POWER_DIOPTERS: 40.00
OD_K2POWER_DIOPTERS: 41.00

## 2020-10-26 ASSESSMENT — REFRACTION_CURRENTRX
OD_CYLINDER: -1.50
OS_VPRISM_DIRECTION: SV
OS_SPHERE: -0.25
OD_OVR_VA: 20/
OS_OVR_VA: 20/
OD_OVR_VA: 20/
OS_SPHERE: +0.25
OD_CYLINDER: -1.50
OD_OVR_VA: 20/
OS_CYLINDER: -1.75
OD_ADD: +2.75
OD_SPHERE: +0.25
OS_VPRISM_DIRECTION: SV
OD_SPHERE: +0.25
OS_SPHERE: +0.25
OD_VPRISM_DIRECTION: SV
OS_SPHERE: -0.25
OS_CYLINDER: -1.75
OS_VPRISM_DIRECTION: SV
OD_VPRISM_DIRECTION: SV
OD_AXIS: 073
OD_OVR_VA: 20/
OD_VPRISM_DIRECTION: SV
OS_CYLINDER: -1.75
OS_OVR_VA: 20/
OS_AXIS: 087
OD_AXIS: 100
OS_OVR_VA: 20/
OD_CYLINDER: -1.50
OD_ADD: +2.75
OD_AXIS: 100
OD_CYLINDER: -1.50
OS_ADD: +2.75
OD_SPHERE: +0.25
OD_AXIS: 073
OS_OVR_VA: 20/
OS_CYLINDER: -1.75
OS_VPRISM_DIRECTION: SV
OS_AXIS: 087
OD_SPHERE: +0.25
OD_VPRISM_DIRECTION: SV
OS_ADD: +2.75
OS_AXIS: 092
OS_AXIS: 092

## 2020-10-26 ASSESSMENT — REFRACTION_AUTOREFRACTION
OS_SPHERE: +0.25
OS_CYLINDER: -0.25
OD_CYLINDER: -0.50
OD_AXIS: 042
OS_AXIS: 060
OS_AXIS: 060
OD_SPHERE: +0.50
OD_CYLINDER: -0.50
OS_CYLINDER: -0.25
OS_SPHERE: +0.25
OD_AXIS: 042
OD_SPHERE: +0.50

## 2020-10-26 ASSESSMENT — DRY EYES - PHYSICIAN NOTES
OS_GENERALCOMMENTS: CORNEAL STAINING
OD_GENERALCOMMENTS: CORNEAL STAINING
OD_GENERALCOMMENTS: CORNEAL STAINING
OS_GENERALCOMMENTS: CORNEAL STAINING

## 2020-10-26 ASSESSMENT — SPHEQUIV_DERIVED
OD_SPHEQUIV: 0.25
OD_SPHEQUIV: 0.25
OS_SPHEQUIV: 0.125
OS_SPHEQUIV: 0.125

## 2020-10-26 ASSESSMENT — AXIALLENGTH_DERIVED
OD_AL: 24.6411
OS_AL: 24.7954
OS_AL: 24.7954
OD_AL: 24.6411

## 2020-10-26 ASSESSMENT — VISUAL ACUITY
OD_BCVA: 20/25-2
OD_BCVA: 20/25-2
OS_BCVA: 20/20
OS_BCVA: 20/20

## 2021-04-09 ENCOUNTER — APPOINTMENT (RX ONLY)
Dept: URBAN - NONMETROPOLITAN AREA CLINIC 23 | Facility: CLINIC | Age: 66
Setting detail: DERMATOLOGY
End: 2021-04-09

## 2021-04-09 DIAGNOSIS — L82.1 OTHER SEBORRHEIC KERATOSIS: ICD-10-CM | Status: INADEQUATELY CONTROLLED

## 2021-04-09 DIAGNOSIS — D18.0 HEMANGIOMA: ICD-10-CM | Status: STABLE

## 2021-04-09 DIAGNOSIS — L81.4 OTHER MELANIN HYPERPIGMENTATION: ICD-10-CM | Status: STABLE

## 2021-04-09 PROBLEM — D18.01 HEMANGIOMA OF SKIN AND SUBCUTANEOUS TISSUE: Status: ACTIVE | Noted: 2021-04-09

## 2021-04-09 PROCEDURE — ? DIAGNOSIS COMMENT

## 2021-04-09 PROCEDURE — ? OBSERVATION

## 2021-04-09 PROCEDURE — 99203 OFFICE O/P NEW LOW 30 MIN: CPT

## 2021-04-09 PROCEDURE — ? COUNSELING

## 2021-04-09 ASSESSMENT — LOCATION DETAILED DESCRIPTION DERM
LOCATION DETAILED: RIGHT PROXIMAL DORSAL FOREARM
LOCATION DETAILED: RIGHT DISTAL DORSAL FOREARM
LOCATION DETAILED: LEFT SUPERIOR NASAL CHEEK
LOCATION DETAILED: LEFT PROXIMAL DORSAL FOREARM
LOCATION DETAILED: LEFT SUPERIOR UPPER BACK
LOCATION DETAILED: LEFT DISTAL DORSAL FOREARM
LOCATION DETAILED: LEFT MEDIAL UPPER BACK
LOCATION DETAILED: LEFT MEDIAL SUPERIOR CHEST
LOCATION DETAILED: LEFT MEDIAL MALAR CHEEK

## 2021-04-09 ASSESSMENT — LOCATION SIMPLE DESCRIPTION DERM
LOCATION SIMPLE: LEFT UPPER BACK
LOCATION SIMPLE: CHEST
LOCATION SIMPLE: RIGHT FOREARM
LOCATION SIMPLE: LEFT FOREARM
LOCATION SIMPLE: LEFT CHEEK

## 2021-04-09 ASSESSMENT — LOCATION ZONE DERM
LOCATION ZONE: ARM
LOCATION ZONE: TRUNK
LOCATION ZONE: FACE

## 2021-04-09 NOTE — PROCEDURE: DIAGNOSIS COMMENT
Comment: Monitor closely.  If the lesion changes such as but not limited to scabbing, scaling, bleeding will biopsy.  Pt agrees to this plan.
Detail Level: Simple
Render Risk Assessment In Note?: no

## 2021-07-06 ENCOUNTER — OFFICE (OUTPATIENT)
Dept: URBAN - METROPOLITAN AREA CLINIC 112 | Facility: CLINIC | Age: 66
Setting detail: OPHTHALMOLOGY
End: 2021-07-06
Payer: MEDICARE

## 2021-07-06 DIAGNOSIS — H04.121: ICD-10-CM

## 2021-07-06 DIAGNOSIS — E11.3311: ICD-10-CM

## 2021-07-06 DIAGNOSIS — H04.122: ICD-10-CM

## 2021-07-06 DIAGNOSIS — E11.3312: ICD-10-CM

## 2021-07-06 DIAGNOSIS — H26.493: ICD-10-CM

## 2021-07-06 DIAGNOSIS — H40.013: ICD-10-CM

## 2021-07-06 PROCEDURE — 99213 OFFICE O/P EST LOW 20 MIN: CPT | Performed by: OPHTHALMOLOGY

## 2021-07-06 PROCEDURE — 92250 FUNDUS PHOTOGRAPHY W/I&R: CPT | Performed by: OPHTHALMOLOGY

## 2021-07-06 PROCEDURE — 83861 MICROFLUID ANALY TEARS: CPT | Performed by: OPHTHALMOLOGY

## 2021-07-06 ASSESSMENT — AXIALLENGTH_DERIVED
OD_AL: 24.7954
OS_AL: 24.8462

## 2021-07-06 ASSESSMENT — CORNEAL EDEMA CLINICAL DESCRIPTION
OD_CORNEALEDEMA: ABSENT
OS_CORNEALEDEMA: ABSENT

## 2021-07-06 ASSESSMENT — REFRACTION_CURRENTRX
OS_AXIS: 092
OS_OVR_VA: 20/
OS_AXIS: 087
OS_CYLINDER: -1.75
OD_OVR_VA: 20/
OD_CYLINDER: -1.50
OS_VPRISM_DIRECTION: SV
OS_OVR_VA: 20/
OS_SPHERE: +0.25
OD_AXIS: 073
OD_VPRISM_DIRECTION: SV
OS_SPHERE: -0.25
OD_OVR_VA: 20/
OS_ADD: +2.75
OD_ADD: +2.75
OD_SPHERE: +0.25
OD_CYLINDER: -1.50
OS_VPRISM_DIRECTION: SV
OD_SPHERE: +0.25
OS_CYLINDER: -1.75
OD_AXIS: 100
OD_VPRISM_DIRECTION: SV

## 2021-07-06 ASSESSMENT — REFRACTION_AUTOREFRACTION
OD_CYLINDER: -0.25
OS_CYLINDER: -0.75
OD_AXIS: 062
OD_SPHERE: +0.25
OS_AXIS: 070
OS_SPHERE: +0.50

## 2021-07-06 ASSESSMENT — KERATOMETRY
OD_K1POWER_DIOPTERS: 40.00
OD_AXISANGLE_DEGREES: 121
OS_K1POWER_DIOPTERS: 40.00
OD_K2POWER_DIOPTERS: 40.50
OS_K2POWER_DIOPTERS: 40.25
OS_AXISANGLE_DEGREES: 114

## 2021-07-06 ASSESSMENT — REFRACTION_MANIFEST
OD_VA1: 20/20
OS_VA1: 20/20
OS_CYLINDER: -0.25
OD_SPHERE: PLANO
OS_AXIS: 080
OS_SPHERE: PLANO

## 2021-07-06 ASSESSMENT — VISUAL ACUITY
OS_BCVA: 20/20-1
OD_BCVA: 20/25

## 2021-07-06 ASSESSMENT — SPHEQUIV_DERIVED
OD_SPHEQUIV: 0.125
OS_SPHEQUIV: 0.125

## 2021-07-06 ASSESSMENT — CONFRONTATIONAL VISUAL FIELD TEST (CVF)
OS_FINDINGS: FULL
OD_FINDINGS: FULL

## 2021-07-06 ASSESSMENT — TONOMETRY
OS_IOP_MMHG: 21
OD_IOP_MMHG: 19

## 2021-07-15 ENCOUNTER — ASC (OUTPATIENT)
Dept: URBAN - METROPOLITAN AREA SURGERY 8 | Facility: SURGERY | Age: 66
Setting detail: OPHTHALMOLOGY
End: 2021-07-15
Payer: MEDICARE

## 2021-07-15 DIAGNOSIS — H26.491: ICD-10-CM

## 2021-07-15 PROCEDURE — 66821 AFTER CATARACT LASER SURGERY: CPT | Performed by: OPHTHALMOLOGY

## 2021-07-15 ASSESSMENT — VISUAL ACUITY
OS_BCVA: 20/25-1
OD_BCVA: 20/25-1

## 2021-07-15 ASSESSMENT — REFRACTION_CURRENTRX
OS_VPRISM_DIRECTION: SV
OS_AXIS: 087
OD_AXIS: 100
OS_CYLINDER: -1.75
OD_ADD: +2.75
OD_OVR_VA: 20/
OD_SPHERE: +0.25
OD_CYLINDER: -1.50
OS_OVR_VA: 20/
OS_SPHERE: +0.25
OD_VPRISM_DIRECTION: SV
OS_ADD: +2.75
OS_AXIS: 092
OS_CYLINDER: -1.75
OD_SPHERE: +0.25
OD_OVR_VA: 20/
OD_CYLINDER: -1.50
OS_SPHERE: -0.25
OS_VPRISM_DIRECTION: SV
OD_AXIS: 073
OS_OVR_VA: 20/
OD_VPRISM_DIRECTION: SV

## 2021-07-15 ASSESSMENT — CORNEAL EDEMA CLINICAL DESCRIPTION
OS_CORNEALEDEMA: ABSENT
OD_CORNEALEDEMA: ABSENT

## 2021-07-15 ASSESSMENT — REFRACTION_MANIFEST
OD_VA1: 20/20
OS_SPHERE: PLANO
OS_CYLINDER: -0.25
OD_SPHERE: PLANO
OS_AXIS: 080
OS_VA1: 20/20

## 2021-07-15 ASSESSMENT — TONOMETRY
OD_IOP_MMHG: 16
OS_IOP_MMHG: 18

## 2021-07-15 ASSESSMENT — KERATOMETRY
OD_K2POWER_DIOPTERS: 41.25
OS_K2POWER_DIOPTERS: 40.00
OS_AXISANGLE_DEGREES: 151
OS_K1POWER_DIOPTERS: 39.75
OD_AXISANGLE_DEGREES: 109
OD_K1POWER_DIOPTERS: 40.00

## 2021-07-15 ASSESSMENT — REFRACTION_AUTOREFRACTION
OS_SPHERE: +0.75
OD_SPHERE: +0.50
OD_AXIS: 060
OS_CYLINDER: -0.75
OS_AXIS: 083
OD_CYLINDER: -0.50

## 2021-07-15 ASSESSMENT — AXIALLENGTH_DERIVED
OD_AL: 24.5911
OS_AL: 24.84

## 2021-07-15 ASSESSMENT — SPHEQUIV_DERIVED
OD_SPHEQUIV: 0.25
OS_SPHEQUIV: 0.375

## 2021-07-21 ENCOUNTER — ASC (OUTPATIENT)
Dept: URBAN - METROPOLITAN AREA SURGERY 8 | Facility: SURGERY | Age: 66
Setting detail: OPHTHALMOLOGY
End: 2021-07-21
Payer: MEDICARE

## 2021-07-21 DIAGNOSIS — H26.492: ICD-10-CM

## 2021-07-21 PROCEDURE — 66821 AFTER CATARACT LASER SURGERY: CPT | Performed by: OPHTHALMOLOGY

## 2021-07-21 ASSESSMENT — KERATOMETRY
OD_K1POWER_DIOPTERS: 40.00
OS_K1POWER_DIOPTERS: 39.75
OS_K2POWER_DIOPTERS: 40.00
OD_K2POWER_DIOPTERS: 41.25
OD_AXISANGLE_DEGREES: 109
OS_AXISANGLE_DEGREES: 151

## 2021-07-21 ASSESSMENT — REFRACTION_CURRENTRX
OS_VPRISM_DIRECTION: SV
OD_VPRISM_DIRECTION: SV
OD_AXIS: 100
OD_ADD: +2.75
OS_SPHERE: +0.25
OS_AXIS: 087
OD_SPHERE: +0.25
OD_CYLINDER: -1.50
OS_VPRISM_DIRECTION: SV
OD_OVR_VA: 20/
OD_VPRISM_DIRECTION: SV
OS_CYLINDER: -1.75
OS_CYLINDER: -1.75
OS_AXIS: 092
OS_OVR_VA: 20/
OD_SPHERE: +0.25
OD_CYLINDER: -1.50
OS_OVR_VA: 20/
OS_ADD: +2.75
OD_AXIS: 073
OD_OVR_VA: 20/
OS_SPHERE: -0.25

## 2021-07-21 ASSESSMENT — AXIALLENGTH_DERIVED
OD_AL: 24.5911
OS_AL: 24.84

## 2021-07-21 ASSESSMENT — REFRACTION_MANIFEST
OD_VA1: 20/20
OS_VA1: 20/20
OS_CYLINDER: -0.25
OD_SPHERE: PLANO
OS_SPHERE: PLANO
OS_AXIS: 080

## 2021-07-21 ASSESSMENT — REFRACTION_AUTOREFRACTION
OS_SPHERE: +0.75
OD_AXIS: 060
OS_CYLINDER: -0.75
OD_SPHERE: +0.50
OS_AXIS: 083
OD_CYLINDER: -0.50

## 2021-07-21 ASSESSMENT — VISUAL ACUITY
OD_BCVA: 20/25-1
OS_BCVA: 20/25-1

## 2021-07-21 ASSESSMENT — SPHEQUIV_DERIVED
OD_SPHEQUIV: 0.25
OS_SPHEQUIV: 0.375

## 2021-08-18 ENCOUNTER — OFFICE (OUTPATIENT)
Dept: URBAN - METROPOLITAN AREA CLINIC 94 | Facility: CLINIC | Age: 66
Setting detail: OPHTHALMOLOGY
End: 2021-08-18

## 2021-08-18 PROCEDURE — NO SHOW FE NO SHOW FEE: Performed by: OPHTHALMOLOGY

## 2021-12-07 ENCOUNTER — OFFICE (OUTPATIENT)
Dept: URBAN - METROPOLITAN AREA CLINIC 113 | Facility: CLINIC | Age: 66
Setting detail: OPHTHALMOLOGY
End: 2021-12-07
Payer: MEDICARE

## 2021-12-07 ENCOUNTER — RX ONLY (RX ONLY)
Age: 66
End: 2021-12-07

## 2021-12-07 DIAGNOSIS — H11.153: ICD-10-CM

## 2021-12-07 DIAGNOSIS — H04.123: ICD-10-CM

## 2021-12-07 DIAGNOSIS — H40.013: ICD-10-CM

## 2021-12-07 DIAGNOSIS — H26.492: ICD-10-CM

## 2021-12-07 DIAGNOSIS — E11.3311: ICD-10-CM

## 2021-12-07 DIAGNOSIS — E11.3312: ICD-10-CM

## 2021-12-07 DIAGNOSIS — H52.03: ICD-10-CM

## 2021-12-07 DIAGNOSIS — H43.813: ICD-10-CM

## 2021-12-07 DIAGNOSIS — H26.491: ICD-10-CM

## 2021-12-07 DIAGNOSIS — H35.033: ICD-10-CM

## 2021-12-07 PROBLEM — Z96.1 PSEUDOPHAKIA ; BOTH EYES: Status: ACTIVE | Noted: 2020-09-18

## 2021-12-07 PROCEDURE — 92015 DETERMINE REFRACTIVE STATE: CPT | Performed by: STUDENT IN AN ORGANIZED HEALTH CARE EDUCATION/TRAINING PROGRAM

## 2021-12-07 PROCEDURE — 92012 INTRM OPH EXAM EST PATIENT: CPT | Performed by: STUDENT IN AN ORGANIZED HEALTH CARE EDUCATION/TRAINING PROGRAM

## 2021-12-07 PROCEDURE — 92133 CPTRZD OPH DX IMG PST SGM ON: CPT | Performed by: STUDENT IN AN ORGANIZED HEALTH CARE EDUCATION/TRAINING PROGRAM

## 2021-12-07 ASSESSMENT — REFRACTION_CURRENTRX
OD_CYLINDER: -1.50
OD_SPHERE: +0.25
OS_OVR_VA: 20/
OD_ADD: +2.75
OS_SPHERE: -0.25
OS_AXIS: 087
OD_OVR_VA: 20/
OD_SPHERE: +0.25
OD_VPRISM_DIRECTION: SV
OD_CYLINDER: -1.50
OS_OVR_VA: 20/
OS_CYLINDER: -1.75
OS_CYLINDER: -1.75
OD_OVR_VA: 20/
OS_VPRISM_DIRECTION: SV
OS_VPRISM_DIRECTION: SV
OS_AXIS: 092
OS_SPHERE: +0.25
OD_VPRISM_DIRECTION: SV
OD_AXIS: 073
OD_AXIS: 100
OS_ADD: +2.75

## 2021-12-07 ASSESSMENT — SPHEQUIV_DERIVED
OD_SPHEQUIV: 0.5
OD_SPHEQUIV: 0.5
OS_SPHEQUIV: 0.625
OS_SPHEQUIV: 0.625

## 2021-12-07 ASSESSMENT — REFRACTION_MANIFEST
OD_SPHERE: +1.00
OD_AXIS: 089
OS_AXIS: 080
OS_VA1: 20/20
OD_VA1: 20/20
OS_VA1: 20/20
OS_CYLINDER: -0.75
OD_SPHERE: PLANO
OS_AXIS: 080
OD_CYLINDER: -1.00
OD_VA1: 20/20
OS_SPHERE: +1.00
OS_SPHERE: PLANO
OS_CYLINDER: -0.25

## 2021-12-07 ASSESSMENT — TONOMETRY
OD_IOP_MMHG: 21
OD_IOP_MMHG: 21

## 2021-12-07 ASSESSMENT — REFRACTION_AUTOREFRACTION
OS_AXIS: 079
OD_SPHERE: +1.00
OS_SPHERE: +1.00
OD_AXIS: 089
OS_CYLINDER: -0.75
OD_CYLINDER: -1.00

## 2021-12-07 ASSESSMENT — KERATOMETRY
OS_K1POWER_DIOPTERS: 39.75
OD_K2POWER_DIOPTERS: 40.00
OS_AXISANGLE_DEGREES: 161
OS_K2POWER_DIOPTERS: 40.25
OD_AXISANGLE_DEGREES: 090
OD_K1POWER_DIOPTERS: 40.00

## 2021-12-07 ASSESSMENT — CORNEAL EDEMA CLINICAL DESCRIPTION
OS_CORNEALEDEMA: ABSENT
OD_CORNEALEDEMA: ABSENT

## 2021-12-07 ASSESSMENT — AXIALLENGTH_DERIVED
OD_AL: 24.7356
OS_AL: 24.6821
OD_AL: 24.7356
OS_AL: 24.6821

## 2021-12-07 ASSESSMENT — VISUAL ACUITY
OS_BCVA: 20/25+1
OD_BCVA: 20/30

## 2022-10-24 ENCOUNTER — APPOINTMENT (RX ONLY)
Dept: URBAN - NONMETROPOLITAN AREA CLINIC 31 | Facility: CLINIC | Age: 67
Setting detail: DERMATOLOGY
End: 2022-10-24

## 2022-10-24 DIAGNOSIS — D22 MELANOCYTIC NEVI: ICD-10-CM

## 2022-10-24 DIAGNOSIS — L82.0 INFLAMED SEBORRHEIC KERATOSIS: ICD-10-CM | Status: INADEQUATELY CONTROLLED

## 2022-10-24 DIAGNOSIS — L57.8 OTHER SKIN CHANGES DUE TO CHRONIC EXPOSURE TO NONIONIZING RADIATION: ICD-10-CM | Status: STABLE

## 2022-10-24 PROBLEM — D22.5 MELANOCYTIC NEVI OF TRUNK: Status: ACTIVE | Noted: 2022-10-24

## 2022-10-24 PROBLEM — D48.5 NEOPLASM OF UNCERTAIN BEHAVIOR OF SKIN: Status: ACTIVE | Noted: 2022-10-24

## 2022-10-24 PROCEDURE — 11102 TANGNTL BX SKIN SINGLE LES: CPT | Mod: 59

## 2022-10-24 PROCEDURE — 99213 OFFICE O/P EST LOW 20 MIN: CPT | Mod: 25

## 2022-10-24 PROCEDURE — ? LIQUID NITROGEN

## 2022-10-24 PROCEDURE — ? COUNSELING

## 2022-10-24 PROCEDURE — ? DIAGNOSIS COMMENT

## 2022-10-24 PROCEDURE — 17110 DESTRUCTION B9 LES UP TO 14: CPT

## 2022-10-24 PROCEDURE — ? BIOPSY BY SHAVE METHOD

## 2022-10-24 ASSESSMENT — LOCATION SIMPLE DESCRIPTION DERM
LOCATION SIMPLE: LEFT FOREARM
LOCATION SIMPLE: CHEST
LOCATION SIMPLE: RIGHT FOREARM
LOCATION SIMPLE: LEFT CHEEK
LOCATION SIMPLE: RIGHT FOREHEAD

## 2022-10-24 ASSESSMENT — LOCATION DETAILED DESCRIPTION DERM
LOCATION DETAILED: LEFT INFERIOR NASAL CHEEK
LOCATION DETAILED: RIGHT INFERIOR MEDIAL FOREHEAD
LOCATION DETAILED: LEFT PROXIMAL DORSAL FOREARM
LOCATION DETAILED: RIGHT PROXIMAL DORSAL FOREARM
LOCATION DETAILED: UPPER STERNUM

## 2022-10-24 ASSESSMENT — LOCATION ZONE DERM
LOCATION ZONE: TRUNK
LOCATION ZONE: ARM
LOCATION ZONE: FACE

## 2022-10-24 NOTE — PROCEDURE: LIQUID NITROGEN
Show Applicator Variable?: Yes
Number Of Freeze-Thaw Cycles: 3 freeze-thaw cycles
Post-Care Instructions: I reviewed with the patient in detail post-care instructions. Patient is to wear sunprotection, and avoid picking at any of the treated lesions. Pt may apply Vaseline to crusted or scabbing areas.
Add 52 Modifier (Optional): no
Pared With?: razor blade
Spray Paint Text: The liquid nitrogen was applied to the skin utilizing a spray paint frosting technique.
Consent: The patient's consent was obtained including but not limited to risks of crusting, scabbing, blistering, scarring, darker or lighter pigmentary change, recurrence, incomplete removal and infection.
Medical Necessity Clause: This procedure was medically necessary because the lesions that were treated were:
Detail Level: Detailed
Duration Of Freeze Thaw-Cycle (Seconds): 10-15
Medical Necessity Information: It is in your best interest to select a reason for this procedure from the list below. All of these items fulfill various CMS LCD requirements except the new and changing color options.

## 2022-10-24 NOTE — PROCEDURE: BIOPSY BY SHAVE METHOD

## 2022-10-24 NOTE — PROCEDURE: DIAGNOSIS COMMENT
Comment: LN2 treatment today, will recheck in one month and determine if shave removal is needed
Detail Level: Simple
Render Risk Assessment In Note?: no

## 2023-01-09 ENCOUNTER — APPOINTMENT (RX ONLY)
Dept: URBAN - NONMETROPOLITAN AREA CLINIC 31 | Facility: CLINIC | Age: 68
Setting detail: DERMATOLOGY
End: 2023-01-09

## 2023-01-09 PROBLEM — C44.519 BASAL CELL CARCINOMA OF SKIN OF OTHER PART OF TRUNK: Status: ACTIVE | Noted: 2023-01-09

## 2023-01-09 PROCEDURE — ? EXCISION

## 2023-01-09 PROCEDURE — 12032 INTMD RPR S/A/T/EXT 2.6-7.5: CPT

## 2023-01-09 PROCEDURE — 11602 EXC TR-EXT MAL+MARG 1.1-2 CM: CPT

## 2023-01-09 NOTE — PROCEDURE: EXCISION

## 2023-01-23 ENCOUNTER — APPOINTMENT (RX ONLY)
Dept: URBAN - NONMETROPOLITAN AREA CLINIC 31 | Facility: CLINIC | Age: 68
Setting detail: DERMATOLOGY
End: 2023-01-23

## 2023-01-23 DIAGNOSIS — Z48.02 ENCOUNTER FOR REMOVAL OF SUTURES: ICD-10-CM

## 2023-01-23 PROCEDURE — ? SUTURE REMOVAL (GLOBAL PERIOD)

## 2023-01-23 ASSESSMENT — LOCATION ZONE DERM: LOCATION ZONE: TRUNK

## 2023-01-23 ASSESSMENT — LOCATION SIMPLE DESCRIPTION DERM: LOCATION SIMPLE: LEFT UPPER BACK

## 2023-01-23 ASSESSMENT — LOCATION DETAILED DESCRIPTION DERM: LOCATION DETAILED: LEFT SUPERIOR UPPER BACK

## 2023-01-23 NOTE — PROCEDURE: SUTURE REMOVAL (GLOBAL PERIOD)
Detail Level: Detailed
Add 48231 Cpt? (Important Note: In 2017 The Use Of 27666 Is Being Tracked By Cms To Determine Future Global Period Reimbursement For Global Periods): no

## 2023-04-24 ENCOUNTER — APPOINTMENT (RX ONLY)
Dept: URBAN - NONMETROPOLITAN AREA CLINIC 31 | Facility: CLINIC | Age: 68
Setting detail: DERMATOLOGY
End: 2023-04-24

## 2023-04-24 VITALS — WEIGHT: 162 LBS | HEIGHT: 55 IN

## 2023-04-24 DIAGNOSIS — D22 MELANOCYTIC NEVI: ICD-10-CM

## 2023-04-24 DIAGNOSIS — L57.8 OTHER SKIN CHANGES DUE TO CHRONIC EXPOSURE TO NONIONIZING RADIATION: ICD-10-CM

## 2023-04-24 PROBLEM — C44.519 BASAL CELL CARCINOMA OF SKIN OF OTHER PART OF TRUNK: Status: ACTIVE | Noted: 2023-04-24

## 2023-04-24 PROBLEM — D22.5 MELANOCYTIC NEVI OF TRUNK: Status: ACTIVE | Noted: 2023-04-24

## 2023-04-24 PROCEDURE — ? COUNSELING

## 2023-04-24 PROCEDURE — 99213 OFFICE O/P EST LOW 20 MIN: CPT

## 2023-04-24 ASSESSMENT — LOCATION SIMPLE DESCRIPTION DERM
LOCATION SIMPLE: RIGHT FOREHEAD
LOCATION SIMPLE: RIGHT FOREARM
LOCATION SIMPLE: CHEST
LOCATION SIMPLE: LEFT FOREARM

## 2023-04-24 ASSESSMENT — LOCATION ZONE DERM
LOCATION ZONE: TRUNK
LOCATION ZONE: FACE
LOCATION ZONE: ARM

## 2023-04-24 ASSESSMENT — LOCATION DETAILED DESCRIPTION DERM
LOCATION DETAILED: LEFT PROXIMAL DORSAL FOREARM
LOCATION DETAILED: UPPER STERNUM
LOCATION DETAILED: RIGHT INFERIOR MEDIAL FOREHEAD
LOCATION DETAILED: RIGHT PROXIMAL DORSAL FOREARM

## 2023-10-24 ENCOUNTER — APPOINTMENT (RX ONLY)
Dept: URBAN - NONMETROPOLITAN AREA CLINIC 31 | Facility: CLINIC | Age: 68
Setting detail: DERMATOLOGY
End: 2023-10-24

## 2023-10-24 DIAGNOSIS — Z85.828 PERSONAL HISTORY OF OTHER MALIGNANT NEOPLASM OF SKIN: ICD-10-CM

## 2023-10-24 DIAGNOSIS — D18.0 HEMANGIOMA: ICD-10-CM

## 2023-10-24 DIAGNOSIS — D22 MELANOCYTIC NEVI: ICD-10-CM

## 2023-10-24 DIAGNOSIS — L82.1 OTHER SEBORRHEIC KERATOSIS: ICD-10-CM

## 2023-10-24 PROBLEM — D18.01 HEMANGIOMA OF SKIN AND SUBCUTANEOUS TISSUE: Status: ACTIVE | Noted: 2023-10-24

## 2023-10-24 PROBLEM — D22.5 MELANOCYTIC NEVI OF TRUNK: Status: ACTIVE | Noted: 2023-10-24

## 2023-10-24 PROCEDURE — ? COUNSELING

## 2023-10-24 PROCEDURE — 99213 OFFICE O/P EST LOW 20 MIN: CPT

## 2023-10-24 ASSESSMENT — LOCATION DETAILED DESCRIPTION DERM
LOCATION DETAILED: RIGHT PROXIMAL DORSAL FOREARM
LOCATION DETAILED: LEFT PROXIMAL DORSAL FOREARM
LOCATION DETAILED: LEFT MEDIAL SUPERIOR CHEST
LOCATION DETAILED: LEFT SUPERIOR UPPER BACK
LOCATION DETAILED: MIDDLE STERNUM

## 2023-10-24 ASSESSMENT — LOCATION ZONE DERM
LOCATION ZONE: ARM
LOCATION ZONE: TRUNK

## 2023-10-24 ASSESSMENT — LOCATION SIMPLE DESCRIPTION DERM
LOCATION SIMPLE: CHEST
LOCATION SIMPLE: RIGHT FOREARM
LOCATION SIMPLE: LEFT FOREARM
LOCATION SIMPLE: LEFT UPPER BACK